# Patient Record
Sex: FEMALE | Race: WHITE | NOT HISPANIC OR LATINO | URBAN - METROPOLITAN AREA
[De-identification: names, ages, dates, MRNs, and addresses within clinical notes are randomized per-mention and may not be internally consistent; named-entity substitution may affect disease eponyms.]

---
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
## 2025-01-16 ENCOUNTER — OFFICE VISIT (OUTPATIENT)
Dept: URGENT CARE | Facility: CLINIC | Age: 20
End: 2025-01-16
Payer: COMMERCIAL

## 2025-01-16 VITALS
HEART RATE: 104 BPM | RESPIRATION RATE: 18 BRPM | BODY MASS INDEX: 23.36 KG/M2 | HEIGHT: 60 IN | TEMPERATURE: 99 F | SYSTOLIC BLOOD PRESSURE: 109 MMHG | OXYGEN SATURATION: 98 % | WEIGHT: 119 LBS | DIASTOLIC BLOOD PRESSURE: 72 MMHG
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
## 2025-01-16 DIAGNOSIS — M25.469 SUPRAPATELLAR EFFUSION OF KNEE: Primary | ICD-10-CM

## 2025-01-16 DIAGNOSIS — M25.562 ACUTE PAIN OF LEFT KNEE: ICD-10-CM

## 2025-01-16 PROCEDURE — 73562 X-RAY EXAM OF KNEE 3: CPT | Mod: LT,S$GLB,, | Performed by: RADIOLOGY

## 2025-01-16 PROCEDURE — 99203 OFFICE O/P NEW LOW 30 MIN: CPT | Mod: 25,S$GLB,, | Performed by: NURSE PRACTITIONER

## 2025-01-16 PROCEDURE — 96372 THER/PROPH/DIAG INJ SC/IM: CPT | Mod: S$GLB,,, | Performed by: NURSE PRACTITIONER

## 2025-01-16 RX ORDER — KETOROLAC TROMETHAMINE 30 MG/ML
30 INJECTION, SOLUTION INTRAMUSCULAR; INTRAVENOUS
Status: COMPLETED | OUTPATIENT
Start: 2025-01-16 | End: 2025-01-16

## 2025-01-16 RX ORDER — FLUTICASONE PROPIONATE 50 MCG
2 SPRAY, SUSPENSION (ML) NASAL
COMMUNITY
Start: 2024-09-20

## 2025-01-16 RX ORDER — NAPROXEN 500 MG/1
500 TABLET ORAL 2 TIMES DAILY
Qty: 20 TABLET | Refills: 0 | Status: SHIPPED | OUTPATIENT
Start: 2025-01-16 | End: 2025-01-26

## 2025-01-16 RX ORDER — AMOXICILLIN 875 MG/1
875 TABLET, FILM COATED ORAL 2 TIMES DAILY
COMMUNITY
Start: 2024-09-20

## 2025-01-16 RX ADMIN — KETOROLAC TROMETHAMINE 30 MG: 30 INJECTION, SOLUTION INTRAMUSCULAR; INTRAVENOUS at 12:01

## 2025-01-16 NOTE — PATIENT INSTRUCTIONS
Fall, initial encounter  Acute pain of left knee    -     XR KNEE 3 VIEW LEFT    Suprapatellar effusion of knee    -     Ambulatory referral/consult to Orthopedics  Referral ordered.  Call [phone] to schedule appt       -     ketorolac injection 30 mg  - given in clinic @ 12:00 pm      -     naproxen (NAPROSYN) 500 MG tablet; Take 1 tablet (500 mg total) by mouth 2 (two) times daily as needed for pain/swelling. Dispense: 20 tablet; Refill: 0.  Do not take if you have a history of GI bleeding, kidney disease, or if you take blood thinners.  Take with food and/or OTC prilosec to decrease GI side effects.     - Tylenol as directed as needed for pain. Tylenol/acetaminophen 650-1000 mg every 6-8 hours for added pain relief. Avoid tylenol if you have a history of liver disease.       Rest - Rest the injured area      Ice - Apply ice  to affected area for the first 24-48 hours.  Ice compress to the affected area 2-3x a day for 15-20 minutes as needed for pain management. (DO NOT APPLY ICE DIRECTLY TO THE SKIN.  DO NOT LEAVE ON AFFECTED BODY PART FOR MORE THAN 20 MINUTES AT AT TIME TO AVOID INJURY TO SOFT TISSUE)      Compression - Wear ACE wrap or splint provided for compression and comfort to help reduce pain and swelling    -     BANDAGE ELASTIC 3IN ACE    Elevate - Elevated affected area higher than your heart to reduce swelling          Follow up with your PCP in 1 week or as needed if no improvement.      If your condition worsens or fails to improve we recommend that you receive another evaluation at the ER immediately or contact your PCP to discuss your concerns or return here.     
No

## 2025-01-16 NOTE — PROGRESS NOTES
Subjective:      Patient ID: Rosa Hoffmann is a 19 y.o. female.    Vitals:  height is 5' (1.524 m) and weight is 54 kg (119 lb). Her oral temperature is 98.5 °F (36.9 °C). Her blood pressure is 109/72 and her pulse is 104. Her respiration is 18 and oxygen saturation is 98%.     Chief Complaint: Knee Injury    Pt is a 20 yo female presenting with complaints of a left knee injury. Injury occurred last night. Pt states she was walking home at night and she tripped on the street. Pt states she could not sleep last night due to the pain. Pt states pain and swelling. Pain level 9. Pt states it hurts to bend or apply pressure to the knee. OTC none     Knee Injury  This is a new problem. The current episode started yesterday. The problem occurs constantly. The problem has been gradually worsening. Associated symptoms include joint swelling (left knee). Pertinent negatives include no chest pain, chills, fatigue, fever, headaches, nausea or vomiting. The symptoms are aggravated by twisting, walking, standing and bending. She has tried nothing for the symptoms.       Constitution: Negative for chills, fatigue and fever.   Cardiovascular:  Negative for chest pain, leg swelling, palpitations and sob on exertion.   Respiratory:  Negative for chest tightness and shortness of breath.    Gastrointestinal:  Negative for nausea, vomiting and diarrhea.   Musculoskeletal:  Positive for pain (left knee) and joint swelling (left knee).   Neurological:  Negative for headaches.      Objective:     Physical Exam   Constitutional: She is oriented to person, place, and time.   Cardiovascular: Normal rate and regular rhythm.   Pulmonary/Chest: Effort normal and breath sounds normal.   Musculoskeletal:      Left knee: She exhibits decreased range of motion, swelling and ecchymosis. She exhibits no deformity, no laceration and no erythema. Tenderness found.      Left lower leg: She exhibits no tenderness and no swelling.        Legs:       Left  foot: Normal range of motion and normal capillary refill. No tenderness, swelling or deformity.      Comments: Left anterior and lateral knee has edema and tenderness, with pain limiting full ROM, especially in ambulation, weight-bearing, full flexion, and full extension.   Neurological: She is alert and oriented to person, place, and time.   Skin: Skin is warm and dry. not left knee  Vitals reviewed.    XR KNEE 3 VIEW LEFT    Result Date: 1/16/2025  EXAMINATION: XR KNEE 3 VIEW LEFT CLINICAL HISTORY: Pain in left knee TECHNIQUE: AP, lateral, and Merchant views of the left knee were performed. COMPARISON: None FINDINGS: The bones appear intact.  There is no evidence for acute fracture or bone destruction.  Joint spaces are well maintained.  A suprapatellar joint effusion is suspected.  Soft tissues are otherwise unremarkable.     No evidence for acute fracture, bone destruction, or dislocation. A suprapatellar joint effusion is suspected. Electronically signed by: Cipriano Allen MD Date:    01/16/2025 Time:    12:13       Assessment:     1. Suprapatellar effusion of knee    2. Acute pain of left knee    3. Fall, initial encounter        Plan:       Suprapatellar effusion of knee  -     Ambulatory referral/consult to Orthopedics    Acute pain of left knee  -     XR KNEE 3 VIEW LEFT; Future; Expected date: 01/16/2025  -     ketorolac injection 30 mg  -     BANDAGE ELASTIC 3IN ACE  -     naproxen (NAPROSYN) 500 MG tablet; Take 1 tablet (500 mg total) by mouth 2 (two) times daily. for 10 days  Dispense: 20 tablet; Refill: 0    Fall, initial encounter      Patient Instructions     Fall, initial encounter  Acute pain of left knee    -     XR KNEE 3 VIEW LEFT    Suprapatellar effusion of knee    -     Ambulatory referral/consult to Orthopedics  Referral ordered.  Call [phone] to schedule appt       -     ketorolac injection 30 mg  - given in clinic @ 12:00 pm      -     naproxen (NAPROSYN) 500 MG tablet; Take 1 tablet  (500 mg total) by mouth 2 (two) times daily as needed for pain/swelling. Dispense: 20 tablet; Refill: 0.  Do not take if you have a history of GI bleeding, kidney disease, or if you take blood thinners.  Take with food and/or OTC prilosec to decrease GI side effects.     - Tylenol as directed as needed for pain. Tylenol/acetaminophen 650-1000 mg every 6-8 hours for added pain relief. Avoid tylenol if you have a history of liver disease.       Rest - Rest the injured area      Ice - Apply ice  to affected area for the first 24-48 hours.  Ice compress to the affected area 2-3x a day for 15-20 minutes as needed for pain management. (DO NOT APPLY ICE DIRECTLY TO THE SKIN.  DO NOT LEAVE ON AFFECTED BODY PART FOR MORE THAN 20 MINUTES AT AT TIME TO AVOID INJURY TO SOFT TISSUE)      Compression - Wear ACE wrap or splint provided for compression and comfort to help reduce pain and swelling    -     BANDAGE ELASTIC 3IN ACE    Elevate - Elevated affected area higher than your heart to reduce swelling          Follow up with your PCP in 1 week or as needed if no improvement.      If your condition worsens or fails to improve we recommend that you receive another evaluation at the ER immediately or contact your PCP to discuss your concerns or return here.